# Patient Record
Sex: FEMALE | Race: WHITE | NOT HISPANIC OR LATINO | Employment: OTHER | URBAN - METROPOLITAN AREA
[De-identification: names, ages, dates, MRNs, and addresses within clinical notes are randomized per-mention and may not be internally consistent; named-entity substitution may affect disease eponyms.]

---

## 2018-05-20 ENCOUNTER — OFFICE VISIT (OUTPATIENT)
Dept: URGENT CARE | Facility: CLINIC | Age: 83
End: 2018-05-20
Payer: MEDICARE

## 2018-05-20 VITALS
HEIGHT: 61 IN | WEIGHT: 110 LBS | TEMPERATURE: 98 F | HEART RATE: 70 BPM | BODY MASS INDEX: 20.77 KG/M2 | OXYGEN SATURATION: 97 % | SYSTOLIC BLOOD PRESSURE: 158 MMHG | DIASTOLIC BLOOD PRESSURE: 61 MMHG

## 2018-05-20 DIAGNOSIS — R30.0 DYSURIA: Primary | ICD-10-CM

## 2018-05-20 DIAGNOSIS — N30.01 ACUTE CYSTITIS WITH HEMATURIA: ICD-10-CM

## 2018-05-20 LAB
BILIRUB UR QL STRIP: NEGATIVE
GLUCOSE UR QL STRIP: NEGATIVE
KETONES UR QL STRIP: NEGATIVE
LEUKOCYTE ESTERASE UR QL STRIP: POSITIVE
PH, POC UA: 5.5 (ref 5–8)
POC BLOOD, URINE: POSITIVE
POC NITRATES, URINE: POSITIVE
PROT UR QL STRIP: POSITIVE
SP GR UR STRIP: 1.02 (ref 1–1.03)
UROBILINOGEN UR STRIP-ACNC: NORMAL (ref 0.1–1.1)

## 2018-05-20 PROCEDURE — 81003 URINALYSIS AUTO W/O SCOPE: CPT | Mod: QW,S$GLB,, | Performed by: INTERNAL MEDICINE

## 2018-05-20 PROCEDURE — 99203 OFFICE O/P NEW LOW 30 MIN: CPT | Mod: 25,S$GLB,, | Performed by: INTERNAL MEDICINE

## 2018-05-20 RX ORDER — SULFAMETHOXAZOLE AND TRIMETHOPRIM 800; 160 MG/1; MG/1
1 TABLET ORAL 2 TIMES DAILY
Qty: 10 TABLET | Refills: 0 | Status: SHIPPED | OUTPATIENT
Start: 2018-05-20 | End: 2018-05-25

## 2018-05-20 RX ORDER — PANTOPRAZOLE SODIUM 40 MG/1
40 TABLET, DELAYED RELEASE ORAL DAILY
COMMUNITY

## 2018-05-20 RX ORDER — CHLORDIAZEPOXIDE HYDROCHLORIDE AND CLIDINIUM BROMIDE 5; 2.5 MG/1; MG/1
1 CAPSULE ORAL
COMMUNITY
End: 2021-12-01

## 2018-05-20 RX ORDER — ESTRADIOL 0.1 MG/G
CREAM VAGINAL
COMMUNITY
End: 2022-01-10 | Stop reason: CLARIF

## 2018-05-20 RX ORDER — PHENAZOPYRIDINE HYDROCHLORIDE 100 MG/1
100 TABLET, FILM COATED ORAL
Qty: 10 TABLET | Refills: 0 | Status: SHIPPED | OUTPATIENT
Start: 2018-05-20 | End: 2018-05-23

## 2018-05-20 RX ORDER — HYDROCHLOROTHIAZIDE 12.5 MG/1
12.5 CAPSULE ORAL DAILY
COMMUNITY
End: 2021-12-01

## 2018-05-20 RX ORDER — BISOPROLOL FUMARATE AND HYDROCHLOROTHIAZIDE 5; 6.25 MG/1; MG/1
1 TABLET ORAL DAILY
COMMUNITY
End: 2023-12-18

## 2018-05-20 NOTE — PROGRESS NOTES
"Subjective:       Patient ID: Lexii Dey is a 85 y.o. female.    Vitals:  height is 5' 1" (1.549 m) and weight is 49.9 kg (110 lb). Her oral temperature is 98.1 °F (36.7 °C). Her blood pressure is 158/61 (abnormal) and her pulse is 70. Her oxygen saturation is 97%.     Chief Complaint: Dysuria    Dysuria    This is a new problem. The current episode started yesterday. The problem has been gradually worsening. The quality of the pain is described as burning. The pain is mild. There has been no fever. She is sexually active. There is no history of pyelonephritis. Associated symptoms include frequency and urgency. Pertinent negatives include no chills, hematuria, nausea or vomiting. Associated symptoms comments: Low back pain. She has tried nothing for the symptoms. The treatment provided no relief.     Review of Systems   Constitution: Negative for chills and fever.   Skin: Negative for itching.   Musculoskeletal: Positive for back pain.   Gastrointestinal: Negative for abdominal pain, nausea and vomiting.   Genitourinary: Positive for dysuria, frequency and urgency. Negative for genital sores, hematuria, missed menses and non-menstrual bleeding.       Objective:      Physical Exam   Abdominal: Soft. Normal appearance and bowel sounds are normal. There is no hepatosplenomegaly. There is no rigidity, no rebound, no guarding, no CVA tenderness, no tenderness at McBurney's point and negative Hess's sign. No hernia.           Assessment:       1. Dysuria    2. Acute cystitis with hematuria        Plan:         Dysuria  -     POCT Urinalysis, Dipstick, Automated, W/O Scope  -     sulfamethoxazole-trimethoprim 800-160mg (BACTRIM DS) 800-160 mg Tab; Take 1 tablet by mouth 2 (two) times daily.  Dispense: 10 tablet; Refill: 0  -     phenazopyridine (PYRIDIUM) 100 MG tablet; Take 1 tablet (100 mg total) by mouth 3 (three) times daily with meals.  Dispense: 10 tablet; Refill: 0    Acute cystitis with hematuria  -     " sulfamethoxazole-trimethoprim 800-160mg (BACTRIM DS) 800-160 mg Tab; Take 1 tablet by mouth 2 (two) times daily.  Dispense: 10 tablet; Refill: 0  -     phenazopyridine (PYRIDIUM) 100 MG tablet; Take 1 tablet (100 mg total) by mouth 3 (three) times daily with meals.  Dispense: 10 tablet; Refill: 0      Take meds

## 2018-05-20 NOTE — PATIENT INSTRUCTIONS
"  Bladder Infection, Female (Adult)    Urine is normally doesn't have any bacteria in it. But bacteria can get into the urinary tract from the skin around the rectum. Or they can travel in the blood from elsewhere in the body. Once they are in your urinary tract, they can cause infection in the urethra (urethritis), the bladder (cystitis), or the kidneys (pyelonephritis).  The most common place for an infection is in the bladder. This is called a bladder infection. This is one of the most common infections in women. Most bladder infections are easily treated. They are not serious unless the infection spreads to the kidney.  The phrases "bladder infection," "UTI," and "cystitis" are often used to describe the same thing. But they are not always the same. Cystitis is an inflammation of the bladder. The most common cause of cystitis is an infection.  Symptoms  The infection causes inflammation in the urethra and bladder. This causes many of the symptoms. The most common symptoms of a bladder infection are:  · Pain or burning when urinating  · Having to urinate more often than usual  · Urgent need to urinate  · Only a small amount of urine comes out  · Blood in urine  · Abdominal discomfort. This is usually in the lower abdomen above the pubic bone.  · Cloudy urine  · Strong- or bad-smelling urine  · Unable to urinate (urinary retention)  · Unable to hold urine in (urinary incontinence)  · Fever  · Loss of appetite  · Confusion (in older adults)  Causes  Bladder infections are not contagious. You can't get one from someone else, from a toilet seat, or from sharing a bath.  The most common cause of bladder infections is bacteria from the bowels. The bacteria get onto the skin around the opening of the urethra. From there, they can get into the urine and travel up to the bladder, causing inflammation and infection. This usually happens because of:  · Wiping improperly after urinating. Always wipe from front to " back.  · Bowel incontinence  · Pregnancy  · Procedures such as having a catheter inserted  · Older age  · Not emptying your bladder. This can allow bacteria a chance to grow in your urine.  · Dehydration  · Constipation  · Sex  · Use of a diaphragm for birth control   Treatment  Bladder infections are diagnosed by a urine test. They are treated with antibiotics and usually clear up quickly without complications. Treatment helps prevent a more serious kidney infection.  Medicines  Medicines can help in the treatment of a bladder infection:  · Take antibiotics until they are used up, even if you feel better. It is important to finish them to make sure the infection has cleared.  · You can use acetaminophen or ibuprofen for pain, fever, or discomfort, unless another medicine was prescribed. If you have chronic liver or kidney disease, talk with your healthcare provider before using these medicines. Also talk with your provider if you've ever had a stomach ulcer or gastrointestinal bleeding, or are taking blood-thinner medicines.  · If you are given phenazopydridine to reduce burning with urination, it will cause your urine to become a bright orange color. This can stain clothing.  Care and prevention  These self-care steps can help prevent future infections:  · Drink plenty of fluids to prevent dehydration and flush out your bladder. Do this unless you must restrict fluids for other health reasons, or your doctor told you not to.  · Proper cleaning after going to the bathroom is important. Wipe from front to back after using the toilet to prevent the spread of bacteria.  · Urinate more often. Don't try to hold urine in for a long time.  · Wear loose-fitting clothes and cotton underwear. Avoid tight-fitting pants.  · Improve your diet and prevent constipation. Eat more fresh fruit and vegetables, and fiber, and less junk and fatty foods.  · Avoid sex until your symptoms are gone.  · Avoid caffeine, alcohol, and spicy  foods. These can irritate your bladder.  · Urinate right after intercourse to flush out your bladder.  · If you use birth control pills and have frequent bladder infections, discuss it with your doctor.  Follow-up care  Call your healthcare provider if all symptoms are not gone after 3 days of treatment. This is especially important if you have repeat infections.  If a culture was done, you will be told if your treatment needs to be changed. If directed, you can call to find out the results.  If X-rays were done, you will be told if the results will affect your treatment.  Call 911  Call 911 if any of the following occur:  · Trouble breathing  · Hard to wake up or confusion  · Fainting or loss of consciousness  · Rapid heart rate  When to seek medical advice  Call your healthcare provider right away if any of these occur:  · Fever of 100.4ºF (38.0ºC) or higher, or as directed by your healthcare provider  · Symptoms are not better by the third day of treatment  · Back or belly (abdominal) pain that gets worse  · Repeated vomiting, or unable to keep medicine down  · Weakness or dizziness  · Vaginal discharge  · Pain, redness, or swelling in the outer vaginal area (labia)  Date Last Reviewed: 10/1/2016  © 1319-4700 [employer]. [address] [place] [address], [place] [address]. All rights reserved. This information is not intended as a substitute for professional medical care. Always follow your healthcare professional's instructions.  Please return here or go to the Emergency Department for any concerns or worsening of condition.  If you were prescribed antibiotics, please take them to completion.  If you were prescribed a narcotic medication, do not drive or operate heavy equipment or machinery while taking these medications.  Please follow up with your primary care doctor or specialist as needed.    If you  smoke, please stop smoking.  *Urinary Tract Infections*  1) No Tub Bathes.  2) Urinate after  [place](Adults).  3)No Fizzy drinks/Soda drinks.  4)Wipe From front to Back.  5)Wear only Cotton Underwear.  6) Take the antibiotic you were given until it is all gone and drink a lot of fluids for 5 to 7 days to Flush Your Kidneys. Studies Show that cranberry Juice does not cure a UTI nor drinking it daily or taking Cranberry tablets will prevent a UTI.

## 2018-05-23 ENCOUNTER — TELEPHONE (OUTPATIENT)
Dept: URGENT CARE | Facility: CLINIC | Age: 83
End: 2018-05-23

## 2018-06-03 ENCOUNTER — OFFICE VISIT (OUTPATIENT)
Dept: URGENT CARE | Facility: CLINIC | Age: 83
End: 2018-06-03
Payer: MEDICARE

## 2018-06-03 VITALS
OXYGEN SATURATION: 97 % | DIASTOLIC BLOOD PRESSURE: 65 MMHG | TEMPERATURE: 98 F | SYSTOLIC BLOOD PRESSURE: 143 MMHG | HEART RATE: 72 BPM | HEIGHT: 60 IN | WEIGHT: 110 LBS | BODY MASS INDEX: 21.6 KG/M2 | RESPIRATION RATE: 16 BRPM

## 2018-06-03 DIAGNOSIS — R30.0 DYSURIA: ICD-10-CM

## 2018-06-03 DIAGNOSIS — N30.01 ACUTE CYSTITIS WITH HEMATURIA: Primary | ICD-10-CM

## 2018-06-03 LAB
BILIRUB UR QL STRIP: NEGATIVE
GLUCOSE UR QL STRIP: NEGATIVE
KETONES UR QL STRIP: NEGATIVE
LEUKOCYTE ESTERASE UR QL STRIP: POSITIVE
PH, POC UA: 6 (ref 5–8)
POC BLOOD, URINE: POSITIVE
POC NITRATES, URINE: NEGATIVE
PROT UR QL STRIP: POSITIVE
SP GR UR STRIP: 1.01 (ref 1–1.03)
UROBILINOGEN UR STRIP-ACNC: NORMAL (ref 0.1–1.1)

## 2018-06-03 PROCEDURE — 81003 URINALYSIS AUTO W/O SCOPE: CPT | Mod: QW,S$GLB,, | Performed by: NURSE PRACTITIONER

## 2018-06-03 PROCEDURE — 99213 OFFICE O/P EST LOW 20 MIN: CPT | Mod: 25,S$GLB,, | Performed by: NURSE PRACTITIONER

## 2018-06-03 RX ORDER — CIPROFLOXACIN 500 MG/1
500 TABLET ORAL 2 TIMES DAILY
Qty: 14 TABLET | Refills: 0 | Status: SHIPPED | OUTPATIENT
Start: 2018-06-03 | End: 2018-06-10

## 2018-06-03 NOTE — PATIENT INSTRUCTIONS
"*Urinary Tract Infections*  1) Avoid tub baths.  2) Always urinate after intercourse(Teens/Adults).  3) Avoid "Fizzy" drinks/soda drinks.  4) Always wipe from front to back.  5) Wear only cotton underwear.  6) Drink a lot of fluids (at least 8-10 glasses of water) for 5 to 7 days to help flush your kidneys. You can also drink 1 shot-sized glass of cranberry juice 3X daily over the next several days to help cleanse your bladder, but studies show that cranberry juice does not cure or prevent a UTI.   7) Take all medications as directed. Make sure to complete all antibiotics as prescribed.    8) For patients above 6 months of age who are not allergic to and are not on anticoagulants, you can alternate Tylenol and Motrin every 4-6 hours for fever above 100.4F and/or pain.  For patients less than 6 months of age, allergic to or intolerant to NSAIDS, have gastritis, gastric ulcers, or history of GI bleeds, are pregnant, or are on anticoagulant therapy, you can take Tylenol every 4 hours as needed for fever above 100.4F and/or pain.   9) You should schedule a follow-up appointment with your Primary Care Provider/Pediatrician for recheck in 2-3 days or as directed at this visit.   10) If your condition fails to improve in a timely manner, you should receive another evaluation by your Primary Care Provider/Pediatrician to discuss your concerns or return to urgent care for a recheck.  If your condition worsens at any time, you should report immediately to your nearest Emergency Department for further evaluation. **You must understand that you have received Urgent Care treatment only and that you may be released before all of your medical problems are known or treated. You, the patient, are responsible to arrange for follow-up care as instructed.       Please follow up with Dr. Leroy in 1-2 days  "

## 2018-06-03 NOTE — PROGRESS NOTES
Subjective:       Patient ID: Lexii Dey is a 85 y.o. female.    Vitals:  height is 5' (1.524 m) and weight is 49.9 kg (110 lb). Her oral temperature is 97.8 °F (36.6 °C). Her blood pressure is 143/65 (abnormal) and her pulse is 72. Her respiration is 16 and oxygen saturation is 97%.     Chief Complaint: Dysuria    Patient had a UTI two weeks ago and place on bactrim.  Better but came back today.      Dysuria    This is a recurrent problem. The current episode started acute onset. The problem occurs every urination. The problem has been unchanged. The quality of the pain is described as burning. The pain is at a severity of 10/10. The patient is experiencing no pain. Associated symptoms include frequency. Pertinent negatives include no chills, hematuria, nausea, urgency or vomiting. Associated symptoms comments: Back pain. She has tried acetaminophen for the symptoms. The treatment provided mild relief. Her past medical history is significant for recurrent UTIs and a urological procedure.     Review of Systems   Constitution: Negative for chills and fever.   Skin: Negative for itching.   Musculoskeletal: Negative for back pain.   Gastrointestinal: Negative for abdominal pain, nausea and vomiting.   Genitourinary: Positive for dysuria and frequency. Negative for genital sores, hematuria, missed menses, non-menstrual bleeding and urgency.       Objective:      Physical Exam   Constitutional: She is oriented to person, place, and time. She appears well-developed and well-nourished.   HENT:   Head: Normocephalic and atraumatic.   Right Ear: External ear normal.   Left Ear: External ear normal.   Nose: Nose normal. No nasal deformity. No epistaxis.   Mouth/Throat: Oropharynx is clear and moist and mucous membranes are normal.   Eyes: Conjunctivae and lids are normal.   Neck: Trachea normal, normal range of motion and phonation normal. Neck supple.   Cardiovascular: Normal rate, regular rhythm, normal heart sounds and  normal pulses.    Pulmonary/Chest: Effort normal and breath sounds normal.   Abdominal: Soft. Normal appearance and bowel sounds are normal. She exhibits no distension and no mass. There is no tenderness. There is no CVA tenderness.   Musculoskeletal:   +Left CVA tenderness   Neurological: She is alert and oriented to person, place, and time.   Skin: Skin is warm, dry and intact.   Psychiatric: She has a normal mood and affect. Her speech is normal and behavior is normal. Cognition and memory are normal.   Nursing note and vitals reviewed.      Assessment:       1. Acute cystitis with hematuria    2. Dysuria        Plan:       Results for orders placed or performed in visit on 06/03/18   POCT Urinalysis, Dipstick, Automated, W/O Scope   Result Value Ref Range    POC Blood, Urine Positive (A) Negative    POC Bilirubin, Urine Negative Negative    POC Urobilinogen, Urine Normal 0.1 - 1.1    POC Ketones, Urine Negative Negative    POC Protein, Urine Positive (A) Negative    POC Nitrates, Urine Negative Negative    POC Glucose, Urine Negative Negative    pH, UA 6.0 5 - 8    POC Specific Gravity, Urine 1.015 1.003 - 1.029    POC Leukocytes, Urine Positive (A) Negative     Acute cystitis with hematuria  -     POCT Urinalysis, Dipstick, Automated, W/O Scope  -     Urine culture  -     ciprofloxacin HCl (CIPRO) 500 MG tablet; Take 1 tablet (500 mg total) by mouth 2 (two) times daily.  Dispense: 14 tablet; Refill: 0    Dysuria  -     POCT Urinalysis, Dipstick, Automated, W/O Scope  -     Urine culture

## 2018-06-06 ENCOUNTER — TELEPHONE (OUTPATIENT)
Dept: URGENT CARE | Facility: CLINIC | Age: 83
End: 2018-06-06

## 2018-06-08 LAB
BACTERIA UR CULT: ABNORMAL
BACTERIA UR CULT: ABNORMAL
OTHER ANTIBIOTIC SUSC ISLT: ABNORMAL

## 2018-09-02 ENCOUNTER — OFFICE VISIT (OUTPATIENT)
Dept: URGENT CARE | Facility: CLINIC | Age: 83
End: 2018-09-02
Payer: MEDICARE

## 2018-09-02 VITALS — HEIGHT: 57 IN | BODY MASS INDEX: 23.73 KG/M2 | WEIGHT: 110 LBS | RESPIRATION RATE: 16 BRPM

## 2018-09-02 DIAGNOSIS — R30.0 DYSURIA: Primary | ICD-10-CM

## 2018-09-02 DIAGNOSIS — N30.00 ACUTE CYSTITIS WITHOUT HEMATURIA: ICD-10-CM

## 2018-09-02 LAB
BILIRUB UR QL STRIP: NEGATIVE
GLUCOSE UR QL STRIP: NEGATIVE
KETONES UR QL STRIP: NEGATIVE
LEUKOCYTE ESTERASE UR QL STRIP: POSITIVE
PH, POC UA: 5.5 (ref 5–8)
POC BLOOD, URINE: POSITIVE
POC NITRATES, URINE: NEGATIVE
PROT UR QL STRIP: POSITIVE
SP GR UR STRIP: 1.02 (ref 1–1.03)
UROBILINOGEN UR STRIP-ACNC: ABNORMAL (ref 0.1–1.1)

## 2018-09-02 PROCEDURE — 99213 OFFICE O/P EST LOW 20 MIN: CPT | Mod: 25,S$GLB,, | Performed by: INTERNAL MEDICINE

## 2018-09-02 PROCEDURE — 81003 URINALYSIS AUTO W/O SCOPE: CPT | Mod: QW,S$GLB,, | Performed by: INTERNAL MEDICINE

## 2018-09-02 RX ORDER — SULFAMETHOXAZOLE AND TRIMETHOPRIM 800; 160 MG/1; MG/1
1 TABLET ORAL 2 TIMES DAILY
Qty: 10 TABLET | Refills: 0 | Status: SHIPPED | OUTPATIENT
Start: 2018-09-02 | End: 2018-09-07

## 2018-09-02 RX ORDER — PHENAZOPYRIDINE HYDROCHLORIDE 100 MG/1
100 TABLET, FILM COATED ORAL
Qty: 10 TABLET | Refills: 0 | Status: SHIPPED | OUTPATIENT
Start: 2018-09-02 | End: 2018-09-05

## 2018-09-02 NOTE — PATIENT INSTRUCTIONS
"  Bladder Infection, Female (Adult)    Urine is normally doesn't have any bacteria in it. But bacteria can get into the urinary tract from the skin around the rectum. Or they can travel in the blood from elsewhere in the body. Once they are in your urinary tract, they can cause infection in the urethra (urethritis), the bladder (cystitis), or the kidneys (pyelonephritis).  The most common place for an infection is in the bladder. This is called a bladder infection. This is one of the most common infections in women. Most bladder infections are easily treated. They are not serious unless the infection spreads to the kidney.  The phrases "bladder infection," "UTI," and "cystitis" are often used to describe the same thing. But they are not always the same. Cystitis is an inflammation of the bladder. The most common cause of cystitis is an infection.  Symptoms  The infection causes inflammation in the urethra and bladder. This causes many of the symptoms. The most common symptoms of a bladder infection are:  · Pain or burning when urinating  · Having to urinate more often than usual  · Urgent need to urinate  · Only a small amount of urine comes out  · Blood in urine  · Abdominal discomfort. This is usually in the lower abdomen above the pubic bone.  · Cloudy urine  · Strong- or bad-smelling urine  · Unable to urinate (urinary retention)  · Unable to hold urine in (urinary incontinence)  · Fever  · Loss of appetite  · Confusion (in older adults)  Causes  Bladder infections are not contagious. You can't get one from someone else, from a toilet seat, or from sharing a bath.  The most common cause of bladder infections is bacteria from the bowels. The bacteria get onto the skin around the opening of the urethra. From there, they can get into the urine and travel up to the bladder, causing inflammation and infection. This usually happens because of:  · Wiping improperly after urinating. Always wipe from front to " back.  · Bowel incontinence  · Pregnancy  · Procedures such as having a catheter inserted  · Older age  · Not emptying your bladder. This can allow bacteria a chance to grow in your urine.  · Dehydration  · Constipation  · Sex  · Use of a diaphragm for birth control   Treatment  Bladder infections are diagnosed by a urine test. They are treated with antibiotics and usually clear up quickly without complications. Treatment helps prevent a more serious kidney infection.  Medicines  Medicines can help in the treatment of a bladder infection:  · Take antibiotics until they are used up, even if you feel better. It is important to finish them to make sure the infection has cleared.  · You can use acetaminophen or ibuprofen for pain, fever, or discomfort, unless another medicine was prescribed. If you have chronic liver or kidney disease, talk with your healthcare provider before using these medicines. Also talk with your provider if you've ever had a stomach ulcer or gastrointestinal bleeding, or are taking blood-thinner medicines.  · If you are given phenazopydridine to reduce burning with urination, it will cause your urine to become a bright orange color. This can stain clothing.  Care and prevention  These self-care steps can help prevent future infections:  · Drink plenty of fluids to prevent dehydration and flush out your bladder. Do this unless you must restrict fluids for other health reasons, or your doctor told you not to.  · Proper cleaning after going to the bathroom is important. Wipe from front to back after using the toilet to prevent the spread of bacteria.  · Urinate more often. Don't try to hold urine in for a long time.  · Wear loose-fitting clothes and cotton underwear. Avoid tight-fitting pants.  · Improve your diet and prevent constipation. Eat more fresh fruit and vegetables, and fiber, and less junk and fatty foods.  · Avoid sex until your symptoms are gone.  · Avoid caffeine, alcohol, and spicy  foods. These can irritate your bladder.  · Urinate right after intercourse to flush out your bladder.  · If you use birth control pills and have frequent bladder infections, discuss it with your doctor.  Follow-up care  Call your healthcare provider if all symptoms are not gone after 3 days of treatment. This is especially important if you have repeat infections.  If a culture was done, you will be told if your treatment needs to be changed. If directed, you can call to find out the results.  If X-rays were done, you will be told if the results will affect your treatment.  Call 911  Call 911 if any of the following occur:  · Trouble breathing  · Hard to wake up or confusion  · Fainting or loss of consciousness  · Rapid heart rate  When to seek medical advice  Call your healthcare provider right away if any of these occur:  · Fever of 100.4ºF (38.0ºC) or higher, or as directed by your healthcare provider  · Symptoms are not better by the third day of treatment  · Back or belly (abdominal) pain that gets worse  · Repeated vomiting, or unable to keep medicine down  · Weakness or dizziness  · Vaginal discharge  · Pain, redness, or swelling in the outer vaginal area (labia)  Date Last Reviewed: 10/1/2016  © 1233-4461 [employer]. [address] [place] [address], [place] [address]. All rights reserved. This information is not intended as a substitute for professional medical care. Always follow your healthcare professional's instructions.  Please return here or go to the Emergency Department for any concerns or worsening of condition.  If you were prescribed antibiotics, please take them to completion.  If you were prescribed a narcotic medication, do not drive or operate heavy equipment or machinery while taking these medications.  Please follow up with your primary care doctor or specialist as needed.    If you  smoke, please stop smoking.  *Urinary Tract Infections*  1) No Tub Bathes.  2) Urinate after  [place](Adults).  3)No Fizzy drinks/Soda drinks.  4)Wipe From front to Back.  5)Wear only Cotton Underwear.  6) Take the antibiotic you were given until it is all gone and drink a lot of fluids for 5 to 7 days to Flush Your Kidneys. Studies Show that cranberry Juice does not cure a UTI nor drinking it daily or taking Cranberry tablets will prevent a UTI.

## 2018-09-02 NOTE — PROGRESS NOTES
"Subjective:       Patient ID: Lexii Dey is a 86 y.o. female.    Vitals:  height is 4' 9" (1.448 m) and weight is 49.9 kg (110 lb). Her respiration is 16.     Chief Complaint: Dysuria    Dysuria    This is a new problem. The current episode started gradual onset. The problem occurs intermittently. The problem has been unchanged. The quality of the pain is described as burning. The pain is at a severity of 8/10. The pain is moderate. There has been no fever. Associated symptoms include flank pain. Pertinent negatives include no chills, hematuria, nausea, urgency or vomiting. She has tried nothing for the symptoms. The treatment provided no relief.     Review of Systems   Constitution: Negative for chills and fever.   Skin: Negative for itching.   Musculoskeletal: Negative for back pain.   Gastrointestinal: Negative for abdominal pain, nausea and vomiting.   Genitourinary: Positive for dysuria and flank pain. Negative for genital sores, hematuria, missed menses, non-menstrual bleeding and urgency.       Objective:      Physical Exam   Constitutional: She is oriented to person, place, and time. She appears well-developed and well-nourished. She is cooperative.  Non-toxic appearance. She does not appear ill. No distress.   HENT:   Head: Normocephalic and atraumatic.   Right Ear: Hearing, tympanic membrane, external ear and ear canal normal.   Left Ear: Hearing, tympanic membrane, external ear and ear canal normal.   Nose: Nose normal. No mucosal edema, rhinorrhea or nasal deformity. No epistaxis. Right sinus exhibits no maxillary sinus tenderness and no frontal sinus tenderness. Left sinus exhibits no maxillary sinus tenderness and no frontal sinus tenderness.   Mouth/Throat: Uvula is midline, oropharynx is clear and moist and mucous membranes are normal. No trismus in the jaw. Normal dentition. No uvula swelling. No posterior oropharyngeal erythema.   Eyes: Conjunctivae and lids are normal. Right eye exhibits no " discharge. Left eye exhibits no discharge. No scleral icterus.   Sclera clear bilat   Neck: Trachea normal, normal range of motion, full passive range of motion without pain and phonation normal. Neck supple.   Cardiovascular: Normal rate, regular rhythm, normal heart sounds, intact distal pulses and normal pulses.   Pulmonary/Chest: Effort normal and breath sounds normal. No respiratory distress.   Abdominal: Soft. Normal appearance and bowel sounds are normal. She exhibits no distension, no pulsatile midline mass and no mass. There is no hepatosplenomegaly. There is no tenderness. There is no rigidity, no rebound, no guarding, no CVA tenderness, no tenderness at McBurney's point and negative Hess's sign. No hernia.       Musculoskeletal: Normal range of motion. She exhibits no edema or deformity.   Neurological: She is alert and oriented to person, place, and time. She exhibits normal muscle tone. Coordination normal.   Skin: Skin is warm, dry and intact. She is not diaphoretic. No pallor.   Psychiatric: She has a normal mood and affect. Her speech is normal and behavior is normal. Judgment and thought content normal. Cognition and memory are normal.   Nursing note and vitals reviewed.      Assessment:       1. Dysuria    2. Acute cystitis without hematuria        Plan:         Dysuria  -     POCT Urinalysis, Dipstick, Automated, W/O Scope  -     sulfamethoxazole-trimethoprim 800-160mg (BACTRIM DS) 800-160 mg Tab; Take 1 tablet by mouth 2 (two) times daily. for 5 days  Dispense: 10 tablet; Refill: 0  -     phenazopyridine (PYRIDIUM) 100 MG tablet; Take 1 tablet (100 mg total) by mouth 3 (three) times daily with meals. for 3 days  Dispense: 10 tablet; Refill: 0    Acute cystitis without hematuria  -     sulfamethoxazole-trimethoprim 800-160mg (BACTRIM DS) 800-160 mg Tab; Take 1 tablet by mouth 2 (two) times daily. for 5 days  Dispense: 10 tablet; Refill: 0  -     phenazopyridine (PYRIDIUM) 100 MG tablet; Take 1  tablet (100 mg total) by mouth 3 (three) times daily with meals. for 3 days  Dispense: 10 tablet; Refill: 0    take meds

## 2018-09-09 ENCOUNTER — TELEPHONE (OUTPATIENT)
Dept: URGENT CARE | Facility: CLINIC | Age: 83
End: 2018-09-09

## 2022-10-24 ENCOUNTER — OFFICE VISIT (OUTPATIENT)
Dept: WOUND CARE | Facility: HOSPITAL | Age: 87
End: 2022-10-24
Attending: SURGERY
Payer: MEDICARE

## 2022-10-24 VITALS
HEART RATE: 80 BPM | SYSTOLIC BLOOD PRESSURE: 130 MMHG | TEMPERATURE: 98 F | DIASTOLIC BLOOD PRESSURE: 86 MMHG | RESPIRATION RATE: 18 BRPM

## 2022-10-24 DIAGNOSIS — L97.812 NON-PRESSURE CHRONIC ULCER OF OTHER PART OF RIGHT LOWER LEG WITH FAT LAYER EXPOSED: ICD-10-CM

## 2022-10-24 DIAGNOSIS — R60.0 EDEMA OF RIGHT LOWER LEG: ICD-10-CM

## 2022-10-24 PROBLEM — L97.822 NON-PRESSURE CHRONIC ULCER OF OTHER PART OF LEFT LOWER LEG WITH FAT LAYER EXPOSED: Status: ACTIVE | Noted: 2022-10-24

## 2022-10-24 PROCEDURE — 25000003 PHARM REV CODE 250

## 2022-10-24 PROCEDURE — 11042 DBRDMT SUBQ TIS 1ST 20SQCM/<: CPT

## 2022-10-24 PROCEDURE — 99214 OFFICE O/P EST MOD 30 MIN: CPT | Mod: 25

## 2022-10-24 PROCEDURE — 99499 NO LOS: ICD-10-PCS | Mod: ,,, | Performed by: SURGERY

## 2022-10-24 PROCEDURE — 99499 UNLISTED E&M SERVICE: CPT | Mod: ,,, | Performed by: SURGERY

## 2022-10-24 NOTE — ASSESSMENT & PLAN NOTE
Patient with mild edema of the right lower leg.    Tubigrip was applied.    Elevation was recommended on a routine basis when not walking.  Patient does have allowing chair and is able to elevate her legs.

## 2022-10-24 NOTE — ASSESSMENT & PLAN NOTE
The shape of the wound suggest his traumatic wound.    Excisional debridement performed of necrotic skin and subcutaneous tissue.    Begin Santyl daily.  Tubigrip and leg elevation.

## 2022-10-24 NOTE — H&P
[facility]  Wound Care  History and Physical    Problem List Items Addressed This Visit          Orthopedic    Non-pressure chronic ulcer of other part of right lower leg with fat layer exposed    Overview     89-year-old female who presented on 10/24/2022 with a wound to her anterior right lower leg.  She indicates the wound had been present for approximately a year.  Visualization of the wound suggest otherwise.  The patient does have a history of memory loss and is likely not able to have a clear history regarding timing.  Patient is not sure exactly how the wound occurred.  Patient complains of pain in the area of the wound on the right lower leg whenever she is walking.  There has been minimal drainage.  There has been no fever or chills.         Current Assessment & Plan     The shape of the wound suggest his traumatic wound.    Excisional debridement performed of necrotic skin and subcutaneous tissue.    Begin Santyl daily.  Tubigrip and leg elevation.            Other    Edema of right lower leg    Current Assessment & Plan     Patient with mild edema of the right lower leg.    Tubigrip was applied.    Elevation was recommended on a routine basis when not walking.  Patient does have allowing chair and is able to elevate her legs.              History:    Past Medical History:   Diagnosis Date    Acid reflux     GERD    Anticoagulant long-term use     Anxiety     Aortic stenosis     Arthritis     CHF (congestive heart failure)     Coronary artery disease     COVID-19 vaccine series completed     Encounter for blood transfusion     Glaucoma     HHD (hypertensive heart disease)     Hypertension     Scoliosis     Stroke     tia    Thyroid disease     HISTORY OF THYROID PROBLEMS BUT RESOLVED    TIA (transient ischemic attack) 2020    UTI (urinary tract infection)     FREQUENT    UTI (urinary tract infection)        Past Surgical History:   Procedure Laterality Date    APPENDECTOMY      BLADDER  SURGERY      CATARACT EXTRACTION, BILATERAL      FOOT SURGERY      HERNIA REPAIR      HYSTERECTOMY      JOINT REPLACEMENT      RIGHT KNEE    KNEE ARTHROSCOPY      LEFT HEART CATHETERIZATION Left 12/7/2021    Procedure: Left heart cath;  Surgeon: Thompson Beaulieu MD;  Location: [facility] CATH;  Service: Cardiology;  Laterality: Left;    PERCUTANEOUS TRANSCATHETER AORTIC VALVE REPLACEMENT (TAVR) Right 1/11/2022    Procedure: REPLACEMENT, AORTIC VALVE, PERCUTANEOUS, TRANSCATHETER;  Surgeon: Thompson Beaulieu MD;  Location: [facility] CATH;  Service: Cardiology;  Laterality: Right;  ops # 2    PERCUTANEOUS TRANSCATHETER AORTIC VALVE REPLACEMENT (TAVR) Right 1/11/2022    Procedure: REPLACEMENT, AORTIC VALVE, PERCUTANEOUS, TRANSCATHETER;  Surgeon: Maribel Lawson MD;  Location: [facility] CATH;  Service: Cardiovascular;  Laterality: Right;       Family History   Problem Relation Age of Onset    Hypertension Mother     Stroke Father     Hypertension Father     Heart disease Sister         reports that she has never smoked. She has never used smokeless tobacco. She reports that she does not drink alcohol and does not use drugs.    has a current medication list which includes the following prescription(s): acetaminophen, aspirin, atorvastatin, bisoprolol, bisoprolol-hydrochlorothiazide 5-6.25 mg, citalopram, clopidogrel, dicyclomine, estrogens (conjugated), ferrous sulfate, latanoprost, melatonin, melatonin, ondansetron, pantoprazole, and valsartan.    Allergies:  Penicillins    Review of Systems:  Review of Systems   Constitutional:  Negative for chills, fever, malaise/fatigue and weight loss.   HENT:  Negative for nosebleeds and sore throat.    Eyes:  Negative for photophobia and pain.   Respiratory:  Negative for cough and hemoptysis.    Cardiovascular:  Positive for leg swelling (right leg). Negative for chest pain and orthopnea.   Gastrointestinal:  Negative for abdominal pain, nausea and vomiting.   Genitourinary:  Negative for  hematuria.   Musculoskeletal:  Negative for back pain and neck pain.   Skin:  Negative for itching and rash.   Neurological:  Negative for focal weakness and seizures.   Endo/Heme/Allergies:  Does not bruise/bleed easily.   Psychiatric/Behavioral:  Positive for memory loss.        Vitals:    10/24/22 1047   BP: 130/86   Pulse: 80   Resp: 18   Temp: 97.6 °F (36.4 °C)         BMI:  There is no height or weight on file to calculate BMI.    Physical Exam:  Physical Exam  Vitals reviewed.   Constitutional:       General: She is not in acute distress.     Appearance: Normal appearance.   HENT:      Head: Normocephalic and atraumatic.      Mouth/Throat:      Mouth: Mucous membranes are moist.   Eyes:      General: No scleral icterus.     Extraocular Movements: Extraocular movements intact.      Pupils: Pupils are equal, round, and reactive to light.   Cardiovascular:      Rate and Rhythm: Normal rate and regular rhythm.      Comments: Patient has bounding bilateral dorsalis pedis pulses.    Patient has nonpalpable posterior tibial pulses.  Pulmonary:      Effort: Pulmonary effort is normal. No respiratory distress.      Breath sounds: Normal breath sounds.   Abdominal:      General: Bowel sounds are normal. There is no distension.      Palpations: Abdomen is soft.      Comments: Low midline scar   Musculoskeletal:         General: Swelling (Mild edema of the right lower extremity) present.      Cervical back: Normal range of motion and neck supple. No tenderness.      Comments: See wound progress note for detailed wound description.     Skin:     General: Skin is warm and dry.      Capillary Refill: Capillary refill takes less than 2 seconds.   Neurological:      General: No focal deficit present.      Mental Status: She is alert and oriented to person, place, and time.   Psychiatric:         Thought Content: Thought content normal.       A1C:  No results for input(s): HGBA1C in the last 2160 hours.  BMP:  No results for  input(s): GLU, NA, K, CL, CO2, BUN, CREATININE, CALCIUM, MG in the last 2160 hours.   CBC:  No results for input(s): WBC, RBC, HGB, HCT, PLT, MCV, MCH, MCHC in the last 2160 hours.  CMP:  No results for input(s): GLU, CALCIUM, ALBUMIN, PROT, NA, K, CO2, CL, BUN, ALKPHOS, ALT, AST, BILITOT in the last 2160 hours.    Invalid input(s): CREATININ  PREALBUMIN:  No results for input(s): PREALBUMIN in the last 2160 hours.  WOUND CULTURES:  No results for input(s): LABAERO in the last 2160 hours.        Plan:  See Wound Docs note for plan and follow up.        Yaw Arellano  [facility]

## 2022-10-24 NOTE — PROGRESS NOTES
[facility]  Wound Care  Progress Note    Problem List Items Addressed This Visit          Orthopedic    Non-pressure chronic ulcer of other part of right lower leg with fat layer exposed    Overview     89-year-old female who presented on 10/24/2022 with a wound to her anterior right lower leg.  She indicates the wound had been present for approximately a year.  Visualization of the wound suggest otherwise.  The patient does have a history of memory loss and is likely not able to have a clear history regarding timing.  Patient is not sure exactly how the wound occurred.  Patient complains of pain in the area of the wound on the right lower leg whenever she is walking.  There has been minimal drainage.  There has been no fever or chills.         Current Assessment & Plan     The shape of the wound suggest his traumatic wound.    Excisional debridement performed of necrotic skin and subcutaneous tissue.    Begin Santyl daily.  Tubigrip and leg elevation.            Other    Edema of right lower leg    Current Assessment & Plan     Patient with mild edema of the right lower leg.    Tubigrip was applied.    Elevation was recommended on a routine basis when not walking.  Patient does have allowing chair and is able to elevate her legs.            See wound doc progress notes. Documents will be scanned.        Yaw Arellano  [facility]

## 2022-10-31 ENCOUNTER — OFFICE VISIT (OUTPATIENT)
Dept: WOUND CARE | Facility: HOSPITAL | Age: 87
End: 2022-10-31
Attending: SURGERY
Payer: MEDICARE

## 2022-10-31 VITALS
TEMPERATURE: 98 F | HEART RATE: 78 BPM | RESPIRATION RATE: 18 BRPM | SYSTOLIC BLOOD PRESSURE: 128 MMHG | DIASTOLIC BLOOD PRESSURE: 84 MMHG

## 2022-10-31 DIAGNOSIS — L97.812 NON-PRESSURE CHRONIC ULCER OF OTHER PART OF RIGHT LOWER LEG WITH FAT LAYER EXPOSED: ICD-10-CM

## 2022-10-31 PROCEDURE — 99499 NO LOS: ICD-10-PCS | Mod: ,,, | Performed by: SURGERY

## 2022-10-31 PROCEDURE — 99499 UNLISTED E&M SERVICE: CPT | Mod: ,,, | Performed by: SURGERY

## 2022-10-31 PROCEDURE — 11042 DBRDMT SUBQ TIS 1ST 20SQCM/<: CPT

## 2022-10-31 NOTE — PROGRESS NOTES
[facility]  Wound Care  Progress Note    Problem List Items Addressed This Visit       Non-pressure chronic ulcer of other part of right lower leg with fat layer exposed    Overview     89-year-old female who presented on 10/24/2022 with a wound to her anterior right lower leg.  She indicates the wound had been present for approximately a year.  Visualization of the wound suggest otherwise.  The patient does have a history of memory loss and is likely not able to have a clear history regarding timing.  Patient is not sure exactly how the wound occurred.  Patient complains of pain in the area of the wound on the right lower leg whenever she is walking.  There has been minimal drainage.  There has been no fever or chills.         Current Assessment & Plan     Wound without infection.  Some granulation tissue present.  Moderate nonviable tissue especially at the posterior aspect of the wound.  Continued sharp debridement is needed.  Continue Santyl.  Continue compression for edema control.            See wound doc progress notes. Documents will be scanned.        Jose Jain  [facility]

## 2022-10-31 NOTE — ASSESSMENT & PLAN NOTE
Wound without infection.  Some granulation tissue present.  Moderate nonviable tissue especially at the posterior aspect of the wound.  Continued sharp debridement is needed.  Continue Santyl.  Continue compression for edema control.

## 2022-11-07 ENCOUNTER — OFFICE VISIT (OUTPATIENT)
Dept: WOUND CARE | Facility: HOSPITAL | Age: 87
End: 2022-11-07
Attending: SURGERY
Payer: MEDICARE

## 2022-11-07 VITALS
TEMPERATURE: 98 F | DIASTOLIC BLOOD PRESSURE: 79 MMHG | SYSTOLIC BLOOD PRESSURE: 126 MMHG | RESPIRATION RATE: 17 BRPM | HEART RATE: 74 BPM

## 2022-11-07 DIAGNOSIS — L97.812 NON-PRESSURE CHRONIC ULCER OF OTHER PART OF RIGHT LOWER LEG WITH FAT LAYER EXPOSED: ICD-10-CM

## 2022-11-07 PROCEDURE — 11042 DBRDMT SUBQ TIS 1ST 20SQCM/<: CPT

## 2022-11-07 PROCEDURE — 99499 UNLISTED E&M SERVICE: CPT | Mod: ,,, | Performed by: SURGERY

## 2022-11-07 PROCEDURE — 99499 NO LOS: ICD-10-PCS | Mod: ,,, | Performed by: SURGERY

## 2022-11-07 NOTE — PROGRESS NOTES
[facility]  Wound Care  Progress Note    Problem List Items Addressed This Visit          Orthopedic    Non-pressure chronic ulcer of other part of right lower leg with fat layer exposed    Overview     89-year-old female who presented on 10/24/2022 with a wound to her anterior right lower leg.  She indicates the wound had been present for approximately a year.  Visualization of the wound suggest otherwise.  The patient does have a history of memory loss and is likely not able to have a clear history regarding timing.  Patient is not sure exactly how the wound occurred.  Patient complains of pain in the area of the wound on the right lower leg whenever she is walking.  There has been minimal drainage.  There has been no fever or chills.         Current Assessment & Plan     Wound is improved.  There is increased granulation tissue.    Continue debridement to remove nonviable slough and maintain active phase wound healing.    Continue Santyl.            See wound doc progress notes. Documents will be scanned.        Yaw Arellano  [facility]

## 2022-11-07 NOTE — ASSESSMENT & PLAN NOTE
Wound is improved.  There is increased granulation tissue.    Continue debridement to remove nonviable slough and maintain active phase wound healing.    Continue Santyl.

## 2022-11-14 ENCOUNTER — OFFICE VISIT (OUTPATIENT)
Dept: WOUND CARE | Facility: HOSPITAL | Age: 87
End: 2022-11-14
Attending: SURGERY
Payer: MEDICARE

## 2022-11-14 VITALS
RESPIRATION RATE: 18 BRPM | DIASTOLIC BLOOD PRESSURE: 68 MMHG | HEART RATE: 79 BPM | TEMPERATURE: 98 F | SYSTOLIC BLOOD PRESSURE: 109 MMHG

## 2022-11-14 DIAGNOSIS — L97.812 NON-PRESSURE CHRONIC ULCER OF OTHER PART OF RIGHT LOWER LEG WITH FAT LAYER EXPOSED: ICD-10-CM

## 2022-11-14 PROCEDURE — 99499 NO LOS: ICD-10-PCS | Mod: ,,, | Performed by: SURGERY

## 2022-11-14 PROCEDURE — 99499 UNLISTED E&M SERVICE: CPT | Mod: ,,, | Performed by: SURGERY

## 2022-11-14 PROCEDURE — 11042 DBRDMT SUBQ TIS 1ST 20SQCM/<: CPT

## 2022-11-14 NOTE — ASSESSMENT & PLAN NOTE
Wound improved.  Minimal slough present.  Wound mostly granulating.  Continued sharp debridement is needed.  Continue Santyl.

## 2022-11-21 ENCOUNTER — OFFICE VISIT (OUTPATIENT)
Dept: WOUND CARE | Facility: HOSPITAL | Age: 87
End: 2022-11-21
Attending: SURGERY
Payer: MEDICARE

## 2022-11-21 VITALS
SYSTOLIC BLOOD PRESSURE: 112 MMHG | TEMPERATURE: 98 F | RESPIRATION RATE: 18 BRPM | HEART RATE: 67 BPM | DIASTOLIC BLOOD PRESSURE: 63 MMHG

## 2022-11-21 DIAGNOSIS — R60.0 LOCALIZED EDEMA: Primary | ICD-10-CM

## 2022-11-21 DIAGNOSIS — L97.812 NON-PRESSURE CHRONIC ULCER OF OTHER PART OF RIGHT LOWER LEG WITH FAT LAYER EXPOSED: ICD-10-CM

## 2022-11-21 PROCEDURE — 99499 UNLISTED E&M SERVICE: CPT | Mod: ,,, | Performed by: SURGERY

## 2022-11-21 PROCEDURE — 11042 DBRDMT SUBQ TIS 1ST 20SQCM/<: CPT

## 2022-11-21 PROCEDURE — 99499 NO LOS: ICD-10-PCS | Mod: ,,, | Performed by: SURGERY

## 2022-11-21 NOTE — ASSESSMENT & PLAN NOTE
Wound continues to improve.  There is granulation tissue and epithelialization at the wound edge.  There is minimal slough present.  Daughter reports moderate drainage.  Will stop Santyl and change the silver alginate.

## 2022-11-21 NOTE — PROGRESS NOTES
[facility]  Wound Care  Progress Note    Problem List Items Addressed This Visit       Non-pressure chronic ulcer of other part of right lower leg with fat layer exposed    Overview     89-year-old female who presented on 10/24/2022 with a wound to her anterior right lower leg.  She indicates the wound had been present for approximately a year.  Visualization of the wound suggest otherwise.  The patient does have a history of memory loss and is likely not able to have a clear history regarding timing.  Patient is not sure exactly how the wound occurred.  Patient complains of pain in the area of the wound on the right lower leg whenever she is walking.  There has been minimal drainage.  There has been no fever or chills.         Current Assessment & Plan     Wound continues to improve.  There is granulation tissue and epithelialization at the wound edge.  There is minimal slough present.  Daughter reports moderate drainage.  Will stop Santyl and change the silver alginate.            See wound doc progress notes. Documents will be scanned.        Jose Jain  [facility]

## 2022-11-28 ENCOUNTER — OFFICE VISIT (OUTPATIENT)
Dept: WOUND CARE | Facility: HOSPITAL | Age: 87
End: 2022-11-28
Attending: SURGERY
Payer: MEDICARE

## 2022-11-28 VITALS
DIASTOLIC BLOOD PRESSURE: 65 MMHG | HEART RATE: 68 BPM | SYSTOLIC BLOOD PRESSURE: 110 MMHG | RESPIRATION RATE: 16 BRPM | TEMPERATURE: 98 F

## 2022-11-28 DIAGNOSIS — L97.812 NON-PRESSURE CHRONIC ULCER OF OTHER PART OF RIGHT LOWER LEG WITH FAT LAYER EXPOSED: Primary | ICD-10-CM

## 2022-11-28 DIAGNOSIS — R60.0 EDEMA OF RIGHT LOWER LEG: ICD-10-CM

## 2022-11-28 DIAGNOSIS — R60.0 LOCALIZED EDEMA: ICD-10-CM

## 2022-11-28 PROCEDURE — 99499 NO LOS: ICD-10-PCS | Mod: ,,, | Performed by: SURGERY

## 2022-11-28 PROCEDURE — 11042 DBRDMT SUBQ TIS 1ST 20SQCM/<: CPT

## 2022-11-28 PROCEDURE — 99499 UNLISTED E&M SERVICE: CPT | Mod: ,,, | Performed by: SURGERY

## 2022-11-28 NOTE — PROGRESS NOTES
[facility]  Wound Care  Progress Note    Problem List Items Addressed This Visit          Orthopedic    Non-pressure chronic ulcer of other part of right lower leg with fat layer exposed - Primary    Overview     89-year-old female who presented on 10/24/2022 with a wound to her anterior right lower leg.  She indicates the wound had been present for approximately a year.  Visualization of the wound suggest otherwise.  The patient does have a history of memory loss and is likely not able to have a clear history regarding timing.  Patient is not sure exactly how the wound occurred.  Patient complains of pain in the area of the wound on the right lower leg whenever she is walking.  There has been minimal drainage.  There has been no fever or chills.         Current Assessment & Plan     Wound is improving.  Continue debridement to remove nonviable tissue and maintain active phase of wound healing.  Continue compression.            Other    Edema of right lower leg     Other Visit Diagnoses       Localized edema                See wound doc progress notes. Documents will be scanned.        Yaw Arellano  [facility]

## 2022-11-28 NOTE — ASSESSMENT & PLAN NOTE
Wound is improving.  Continue debridement to remove nonviable tissue and maintain active phase of wound healing.  Continue compression.

## 2022-12-05 ENCOUNTER — OFFICE VISIT (OUTPATIENT)
Dept: WOUND CARE | Facility: HOSPITAL | Age: 87
End: 2022-12-05
Attending: SURGERY
Payer: MEDICARE

## 2022-12-05 VITALS
RESPIRATION RATE: 18 BRPM | TEMPERATURE: 98 F | DIASTOLIC BLOOD PRESSURE: 68 MMHG | HEART RATE: 68 BPM | SYSTOLIC BLOOD PRESSURE: 130 MMHG

## 2022-12-05 DIAGNOSIS — L97.812 NON-PRESSURE CHRONIC ULCER OF OTHER PART OF RIGHT LOWER LEG WITH FAT LAYER EXPOSED: ICD-10-CM

## 2022-12-05 PROCEDURE — 99499 UNLISTED E&M SERVICE: CPT | Mod: ,,, | Performed by: SURGERY

## 2022-12-05 PROCEDURE — 11042 DBRDMT SUBQ TIS 1ST 20SQCM/<: CPT

## 2022-12-05 PROCEDURE — 99499 NO LOS: ICD-10-PCS | Mod: ,,, | Performed by: SURGERY

## 2022-12-05 NOTE — PROGRESS NOTES
[facility]  Wound Care  Progress Note    Problem List Items Addressed This Visit       Non-pressure chronic ulcer of other part of right lower leg with fat layer exposed    Overview     89-year-old female who presented on 10/24/2022 with a wound to her anterior right lower leg.  She indicates the wound had been present for approximately a year.  Visualization of the wound suggest otherwise.  The patient does have a history of memory loss and is likely not able to have a clear history regarding timing.  Patient is not sure exactly how the wound occurred.  Patient complains of pain in the area of the wound on the right lower leg whenever she is walking.  There has been minimal drainage.  There has been no fever or chills.         Current Assessment & Plan     Wound continues to improved and is nearly resolved.  Wound bed clean and granulating.  Continue current management anticipate wound closure in the next 2 weeks            See wound doc progress notes. Documents will be scanned.        Jose Jain  [facility]

## 2022-12-05 NOTE — PROGRESS NOTES
[facility]  Wound Care  Progress Note    Problem List Items Addressed This Visit       Non-pressure chronic ulcer of other part of right lower leg with fat layer exposed    Overview     89-year-old female who presented on 10/24/2022 with a wound to her anterior right lower leg.  She indicates the wound had been present for approximately a year.  Visualization of the wound suggest otherwise.  The patient does have a history of memory loss and is likely not able to have a clear history regarding timing.  Patient is not sure exactly how the wound occurred.  Patient complains of pain in the area of the wound on the right lower leg whenever she is walking.  There has been minimal drainage.  There has been no fever or chills.         Current Assessment & Plan     Wound improved.  Minimal slough present.  Wound mostly granulating.  Continued sharp debridement is needed.  Continue Santyl.            See wound doc progress notes. Documents will be scanned.        Jose Jain  [facility]

## 2022-12-05 NOTE — ASSESSMENT & PLAN NOTE
Wound continues to improved and is nearly resolved.  Wound bed clean and granulating.  Continue current management anticipate wound closure in the next 2 weeks

## 2022-12-12 ENCOUNTER — OFFICE VISIT (OUTPATIENT)
Dept: WOUND CARE | Facility: HOSPITAL | Age: 87
End: 2022-12-12
Attending: SURGERY
Payer: MEDICARE

## 2022-12-12 VITALS
TEMPERATURE: 98 F | DIASTOLIC BLOOD PRESSURE: 67 MMHG | RESPIRATION RATE: 18 BRPM | SYSTOLIC BLOOD PRESSURE: 128 MMHG | HEART RATE: 66 BPM

## 2022-12-12 DIAGNOSIS — R60.0 EDEMA OF RIGHT LOWER LEG: ICD-10-CM

## 2022-12-12 DIAGNOSIS — L97.812 NON-PRESSURE CHRONIC ULCER OF OTHER PART OF RIGHT LOWER LEG WITH FAT LAYER EXPOSED: Primary | ICD-10-CM

## 2022-12-12 DIAGNOSIS — R60.0 LOCALIZED EDEMA: ICD-10-CM

## 2022-12-12 PROCEDURE — 11042 DBRDMT SUBQ TIS 1ST 20SQCM/<: CPT

## 2022-12-12 PROCEDURE — 99499 UNLISTED E&M SERVICE: CPT | Mod: ,,, | Performed by: SURGERY

## 2022-12-12 PROCEDURE — 99499 NO LOS: ICD-10-PCS | Mod: ,,, | Performed by: SURGERY

## 2022-12-12 NOTE — ASSESSMENT & PLAN NOTE
Wound is markedly improved.    Continue debridement to maintain active phase wound healing.    Continue silver alginate.    Continue compression.

## 2022-12-12 NOTE — PROGRESS NOTES
[facility]  Wound Care  Progress Note    Problem List Items Addressed This Visit          Orthopedic    Non-pressure chronic ulcer of other part of right lower leg with fat layer exposed - Primary    Overview     89-year-old female who presented on 10/24/2022 with a wound to her anterior right lower leg.  She indicates the wound had been present for approximately a year.  Visualization of the wound suggest otherwise.  The patient does have a history of memory loss and is likely not able to have a clear history regarding timing.  Patient is not sure exactly how the wound occurred.  Patient complains of pain in the area of the wound on the right lower leg whenever she is walking.  There has been minimal drainage.  There has been no fever or chills.         Current Assessment & Plan     Wound is markedly improved.    Continue debridement to maintain active phase wound healing.    Continue silver alginate.    Continue compression.            Other    Edema of right lower leg    Current Assessment & Plan     Controlled          Other Visit Diagnoses       Localized edema                See wound doc progress notes. Documents will be scanned.        Yaw Arellano  [facility]

## 2023-01-09 ENCOUNTER — OFFICE VISIT (OUTPATIENT)
Dept: WOUND CARE | Facility: HOSPITAL | Age: 88
End: 2023-01-09
Attending: SURGERY
Payer: MEDICARE

## 2023-01-09 VITALS
RESPIRATION RATE: 17 BRPM | DIASTOLIC BLOOD PRESSURE: 67 MMHG | HEART RATE: 60 BPM | TEMPERATURE: 98 F | SYSTOLIC BLOOD PRESSURE: 130 MMHG

## 2023-01-09 DIAGNOSIS — L97.812 NON-PRESSURE CHRONIC ULCER OF OTHER PART OF RIGHT LOWER LEG WITH FAT LAYER EXPOSED: ICD-10-CM

## 2023-01-09 PROCEDURE — 99499 NO LOS: ICD-10-PCS | Mod: ,,, | Performed by: SURGERY

## 2023-01-09 PROCEDURE — 99499 UNLISTED E&M SERVICE: CPT | Mod: ,,, | Performed by: SURGERY

## 2023-01-09 PROCEDURE — 1159F MED LIST DOCD IN RCRD: CPT | Mod: CPTII,,, | Performed by: SURGERY

## 2023-01-09 PROCEDURE — 99214 OFFICE O/P EST MOD 30 MIN: CPT

## 2023-01-09 PROCEDURE — 1159F PR MEDICATION LIST DOCUMENTED IN MEDICAL RECORD: ICD-10-PCS | Mod: CPTII,,, | Performed by: SURGERY

## 2023-01-09 NOTE — PROGRESS NOTES
[facility]  Wound Care  Progress Note    Problem List Items Addressed This Visit       Non-pressure chronic ulcer of other part of right lower leg with fat layer exposed    Overview     89-year-old female who presented on 10/24/2022 with a wound to her anterior right lower leg.  She indicates the wound had been present for approximately a year.  Visualization of the wound suggest otherwise.  The patient does have a history of memory loss and is likely not able to have a clear history regarding timing.  Patient is not sure exactly how the wound occurred.  Patient complains of pain in the area of the wound on the right lower leg whenever she is walking.  There has been minimal drainage.  There has been no fever or chills.         Current Assessment & Plan     Wound has resolved.  Patient will be discharged from the Wound Center            See wound doc progress notes. Documents will be scanned.        Jose Jain  [facility]

## 2023-12-18 ENCOUNTER — LAB VISIT (OUTPATIENT)
Dept: LAB | Facility: HOSPITAL | Age: 88
End: 2023-12-18
Attending: PSYCHIATRY & NEUROLOGY
Payer: MEDICARE

## 2023-12-18 ENCOUNTER — OFFICE VISIT (OUTPATIENT)
Dept: NEUROLOGY | Facility: CLINIC | Age: 88
End: 2023-12-18
Payer: MEDICARE

## 2023-12-18 VITALS
DIASTOLIC BLOOD PRESSURE: 82 MMHG | HEIGHT: 60 IN | WEIGHT: 132.94 LBS | HEART RATE: 84 BPM | BODY MASS INDEX: 26.1 KG/M2 | SYSTOLIC BLOOD PRESSURE: 130 MMHG | RESPIRATION RATE: 14 BRPM

## 2023-12-18 DIAGNOSIS — F01.50 MIXED ALZHEIMER'S AND VASCULAR DEMENTIA: ICD-10-CM

## 2023-12-18 DIAGNOSIS — I10 HYPERTENSION, UNSPECIFIED TYPE: ICD-10-CM

## 2023-12-18 DIAGNOSIS — R41.3 OTHER AMNESIA: ICD-10-CM

## 2023-12-18 DIAGNOSIS — E78.5 DYSLIPIDEMIA: ICD-10-CM

## 2023-12-18 DIAGNOSIS — I69.30 LATE EFFECT OF CEREBROVASCULAR ACCIDENT (CVA): ICD-10-CM

## 2023-12-18 DIAGNOSIS — F01.50 MIXED ALZHEIMER'S AND VASCULAR DEMENTIA: Primary | ICD-10-CM

## 2023-12-18 DIAGNOSIS — F02.80 MIXED ALZHEIMER'S AND VASCULAR DEMENTIA: ICD-10-CM

## 2023-12-18 DIAGNOSIS — F02.80 MIXED ALZHEIMER'S AND VASCULAR DEMENTIA: Primary | ICD-10-CM

## 2023-12-18 DIAGNOSIS — G30.9 MIXED ALZHEIMER'S AND VASCULAR DEMENTIA: Primary | ICD-10-CM

## 2023-12-18 DIAGNOSIS — G30.9 MIXED ALZHEIMER'S AND VASCULAR DEMENTIA: ICD-10-CM

## 2023-12-18 LAB
TSH SERPL DL<=0.005 MIU/L-ACNC: 3.52 UIU/ML (ref 0.4–4)
VIT B12 SERPL-MCNC: 225 PG/ML (ref 210–950)

## 2023-12-18 PROCEDURE — 1126F PR PAIN SEVERITY QUANTIFIED, NO PAIN PRESENT: ICD-10-PCS | Mod: CPTII,S$GLB,, | Performed by: PSYCHIATRY & NEUROLOGY

## 2023-12-18 PROCEDURE — 84443 ASSAY THYROID STIM HORMONE: CPT | Performed by: PSYCHIATRY & NEUROLOGY

## 2023-12-18 PROCEDURE — 82607 VITAMIN B-12: CPT | Performed by: PSYCHIATRY & NEUROLOGY

## 2023-12-18 PROCEDURE — 99204 PR OFFICE/OUTPT VISIT, NEW, LEVL IV, 45-59 MIN: ICD-10-PCS | Mod: S$GLB,,, | Performed by: PSYCHIATRY & NEUROLOGY

## 2023-12-18 PROCEDURE — 36415 COLL VENOUS BLD VENIPUNCTURE: CPT | Performed by: PSYCHIATRY & NEUROLOGY

## 2023-12-18 PROCEDURE — 99204 OFFICE O/P NEW MOD 45 MIN: CPT | Mod: S$GLB,,, | Performed by: PSYCHIATRY & NEUROLOGY

## 2023-12-18 PROCEDURE — 1159F MED LIST DOCD IN RCRD: CPT | Mod: CPTII,S$GLB,, | Performed by: PSYCHIATRY & NEUROLOGY

## 2023-12-18 PROCEDURE — 1160F RVW MEDS BY RX/DR IN RCRD: CPT | Mod: CPTII,S$GLB,, | Performed by: PSYCHIATRY & NEUROLOGY

## 2023-12-18 PROCEDURE — 1160F PR REVIEW ALL MEDS BY PRESCRIBER/CLIN PHARMACIST DOCUMENTED: ICD-10-PCS | Mod: CPTII,S$GLB,, | Performed by: PSYCHIATRY & NEUROLOGY

## 2023-12-18 PROCEDURE — 1126F AMNT PAIN NOTED NONE PRSNT: CPT | Mod: CPTII,S$GLB,, | Performed by: PSYCHIATRY & NEUROLOGY

## 2023-12-18 PROCEDURE — 99999 PR PBB SHADOW E&M-EST. PATIENT-LVL IV: CPT | Mod: PBBFAC,,, | Performed by: PSYCHIATRY & NEUROLOGY

## 2023-12-18 PROCEDURE — 1159F PR MEDICATION LIST DOCUMENTED IN MEDICAL RECORD: ICD-10-PCS | Mod: CPTII,S$GLB,, | Performed by: PSYCHIATRY & NEUROLOGY

## 2023-12-18 PROCEDURE — 99999 PR PBB SHADOW E&M-EST. PATIENT-LVL IV: ICD-10-PCS | Mod: PBBFAC,,, | Performed by: PSYCHIATRY & NEUROLOGY

## 2023-12-18 RX ORDER — FUROSEMIDE 20 MG/1
20 TABLET ORAL DAILY
COMMUNITY
Start: 2023-12-06

## 2023-12-18 RX ORDER — METOPROLOL SUCCINATE 25 MG/1
25 TABLET, EXTENDED RELEASE ORAL 2 TIMES DAILY
COMMUNITY
Start: 2023-10-25

## 2023-12-18 RX ORDER — DONEPEZIL HYDROCHLORIDE 5 MG/1
5 TABLET, FILM COATED ORAL NIGHTLY
COMMUNITY
Start: 2023-11-28

## 2023-12-18 RX ORDER — MEMANTINE HYDROCHLORIDE 5 MG/1
TABLET ORAL
Qty: 60 TABLET | Refills: 11 | Status: SHIPPED | OUTPATIENT
Start: 2023-12-18

## 2023-12-18 RX ORDER — SUCRALFATE 1 G/1
1 TABLET ORAL 2 TIMES DAILY
COMMUNITY
Start: 2023-09-12

## 2023-12-18 RX ORDER — HYDROCHLOROTHIAZIDE 12.5 MG/1
12.5 TABLET ORAL DAILY
COMMUNITY
Start: 2023-09-05

## 2023-12-18 RX ORDER — ARIPIPRAZOLE 2 MG/1
2 TABLET ORAL NIGHTLY
COMMUNITY
Start: 2023-11-21

## 2023-12-18 NOTE — PROGRESS NOTES
"Consult from Dr Hernandez    HPI: Lexii Dey is a 89 y.o. female with a history of memory problems first noted by patient her children . The symptoms also include not know time relationships  Examples of memory problems noted include? May forget a recent conversation. Forgets appointment, getting more accusatory against her father which has improved on Abilify per PCP  Gets easily annoyed with     On aricept per PCP    Has had some UTIs which makes symptoms worse    Denies tremor and walks with a walker (started walker after some falls at home)      Level of education completed is  9th grade education    Can dress self, do her hygiene.    Does get out socially and family wishes she would do this more      Lives in assisted living: [facility] which she has lived for nearly 3 years. Lives there with her  of over 70 years  Here with  her daughter and Son in law today    Has one more living child with Down Syndrome. One other child  with Down Syndrome in infancy.     P is 84      Review of Systems   Unable to perform ROS: Dementia         I have reviewed all of this patient's past medical and surgical histories as well as family and social histories and active allergies and medications as documented in the electronic medical record.        Exam:  Gen Appearance, well developed/nourished in no apparent distress  CV: 2+ distal pulses with no edema or swelling  Neuro:  MS: Awake, alert, oriented to place, person, but not fully to time, situation. Sustains attention. Recent recall was 1/3 at PCP's office and easily forgotten details here/remote memory intact, Language is full to spontaneous speech/repetition/naming/comprehension. Fund of Knowledge is full  Not able to name current president. Names "[place]."   + apraxia  Poor insight into challenges  CN: Optic discs are flat with normal vasculature, PERRL, Extraoccular movements and visual fields are full. Normal facial sensation and strength, " Hearing symmetric, Tongue and Palate are midline and strong. Shoulder Shrug symmetric and strong.  Motor: Normal bulk, tone, no abnormal movements. 5/5 strength bilateral upper/lower extremities with 2+ reflexes and no clonus  Sensory: symmetric to temp, and vibration. 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   Labs: 2022 Labs; Chronic anemia by CBC. Normal Cr and LFTs by CMP    Assessment/Plan: Lexii Dey is a 89 y.o. female with memory loss noted this year. Symptoms rise to the level of dementia  I recommend:   Note: CT head 2/2022:  Old subcentimeter lacunar infarct right caudate head and left external capsule.   -She has a known history of stroke (left arm weakness in 2020) and per PCP's note Carotid US no blockage in 2022 and echo EF 60%. No afib history known/ likely has had cardiac monitor per family. Otherwise, this could be from her HTN, DLD (lacunar)    2. Update CT head  -she is on an ASA for stroke prevention. Consider updating cardiac monitoring if another stroke is noted.     3. TSH, B12 needed per orders    4. 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   -PCP has her on Aricept/ continue  -Add Namenda unless side effects  -Note her use of Abilify per PCP for mood and agitation/ delusions and this helps    RTC 12 weeks  CC:  Dr. Hernandez

## 2023-12-26 ENCOUNTER — HOSPITAL ENCOUNTER (OUTPATIENT)
Dept: RADIOLOGY | Facility: HOSPITAL | Age: 88
Discharge: HOME | End: 2023-12-26
Attending: PSYCHIATRY & NEUROLOGY
Payer: MEDICARE

## 2023-12-26 DIAGNOSIS — R41.3 OTHER AMNESIA: ICD-10-CM

## 2023-12-26 PROCEDURE — 70450 CT HEAD/BRAIN W/O DYE: CPT | Mod: 26,,, | Performed by: RADIOLOGY

## 2023-12-26 PROCEDURE — 70450 CT HEAD WITHOUT CONTRAST: ICD-10-PCS | Mod: 26,,, | Performed by: RADIOLOGY

## 2023-12-26 PROCEDURE — 70450 CT HEAD/BRAIN W/O DYE: CPT | Mod: TC

## 2024-03-11 ENCOUNTER — OFFICE VISIT (OUTPATIENT)
Dept: NEUROLOGY | Facility: CLINIC | Age: 89
End: 2024-03-11
Payer: MEDICARE

## 2024-03-11 VITALS
HEART RATE: 72 BPM | BODY MASS INDEX: 26.05 KG/M2 | DIASTOLIC BLOOD PRESSURE: 66 MMHG | WEIGHT: 132.69 LBS | SYSTOLIC BLOOD PRESSURE: 128 MMHG | RESPIRATION RATE: 14 BRPM | HEIGHT: 60 IN

## 2024-03-11 DIAGNOSIS — G30.9 MIXED ALZHEIMER'S AND VASCULAR DEMENTIA: Primary | ICD-10-CM

## 2024-03-11 DIAGNOSIS — F01.50 MIXED ALZHEIMER'S AND VASCULAR DEMENTIA: Primary | ICD-10-CM

## 2024-03-11 DIAGNOSIS — I10 HYPERTENSION, UNSPECIFIED TYPE: ICD-10-CM

## 2024-03-11 DIAGNOSIS — I69.30 LATE EFFECT OF CEREBROVASCULAR ACCIDENT (CVA): ICD-10-CM

## 2024-03-11 DIAGNOSIS — E78.5 DYSLIPIDEMIA: ICD-10-CM

## 2024-03-11 DIAGNOSIS — F02.80 MIXED ALZHEIMER'S AND VASCULAR DEMENTIA: Primary | ICD-10-CM

## 2024-03-11 PROCEDURE — 1159F MED LIST DOCD IN RCRD: CPT | Mod: CPTII,S$GLB,, | Performed by: PSYCHIATRY & NEUROLOGY

## 2024-03-11 PROCEDURE — 99214 OFFICE O/P EST MOD 30 MIN: CPT | Mod: S$GLB,,, | Performed by: PSYCHIATRY & NEUROLOGY

## 2024-03-11 PROCEDURE — 1126F AMNT PAIN NOTED NONE PRSNT: CPT | Mod: CPTII,S$GLB,, | Performed by: PSYCHIATRY & NEUROLOGY

## 2024-03-11 PROCEDURE — 1160F RVW MEDS BY RX/DR IN RCRD: CPT | Mod: CPTII,S$GLB,, | Performed by: PSYCHIATRY & NEUROLOGY

## 2024-03-11 PROCEDURE — 99999 PR PBB SHADOW E&M-EST. PATIENT-LVL III: CPT | Mod: PBBFAC,,, | Performed by: PSYCHIATRY & NEUROLOGY

## 2024-03-11 NOTE — PROGRESS NOTES
HPI: Lexii Dey is a 89 y.o. female with a history of memory problems    B12 is used daily     Namenda is being given 1 BID but daughter will take over med as  sees she has extra with him administering.     Memory is about the same    On aricept per PCP and consistency of use is not certain    Mood is better    Delulsions don't seem active    No hallucinations      Daughter wishes she would get out more in the Assisted living. She does go to [place] daily and family encourages her to do this.  She does get out of the facility once a month    Lives in assisted living: [facility]       Review of Systems   Unable to perform ROS: Dementia         I have reviewed all of this patient's past medical and surgical histories as well as family and social histories and active allergies and medications as documented in the electronic medical record.        Exam:  Gen Appearance, well developed/nourished in no apparent distress  CV: 2+ distal pulses with no edema or swelling  Neuro:  MS: Awake, alert, oriented to place, person, but to time, situation. Sustains attention. Recent recall  is forgetful/remote memory intact, Language is full to spontaneous speech/repetition/naming/comprehension. Fund of Knowledge is full  + apraxia  Poor insight into challenges  CN: Optic discs are flat with normal vasculature, PERRL, Extraoccular movements and visual fields are full. Normal facial sensation and strength, Hearing symmetric, Tongue and Palate are midline and strong. Shoulder Shrug symmetric and strong.  Motor: Normal bulk, tone, no abnormal movements. 5/5 strength bilateral upper/lower extremities with 2+ reflexes and no clonus  Sensory: symmetric to temp, and vibration. Romberg negative  Cerebellar: Finger-nose,Heal-shin, Rapid alternating movements intact  Gait: Normal stance, mild  ataxia and shorter steps normal. Uses walker well    Imaging: CT head 2/2022:  Old subcentimeter lacunar infarct right caudate head and  left external capsule.     12/2023 CT head: No acute abnormality.     Labs: 2022 Labs; Chronic anemia by CBC. Normal Cr and LFTs by CMP    2023 B12 200s  Tsh Normal    Assessment/Plan: Lexii Dey is a 89 y.o. female with memory loss noted this year. Symptoms rise to the level of dementia  I recommend:     Note: CT head 2/2022:  Old subcentimeter lacunar infarct right caudate head and left external capsule.   -She has a known history of stroke (left arm weakness in 2020) and per PCP's note Carotid US no blockage in 2022 and echo EF 60%. No afib history known/ likely has had cardiac monitor per family. Otherwise, this could be from her HTN, DLD (lacunar)    2. Updated CT head 12/2023: no changes  -she is on an ASA for stroke prevention.     3. 2023 B12 level lower normal  -Continue po B12  -Follow up B12 level after the next visit    4. 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   -PCP has her on Aricept/ continue  -Added Namenda. Not clear if she is fully compliant. Daughter will take over med management  -Note her use of Abilify per PCP for mood and agitation/ delusions and this helps    RTC  6 months

## 2024-07-10 DIAGNOSIS — G30.9 MIXED ALZHEIMER'S AND VASCULAR DEMENTIA: ICD-10-CM

## 2024-07-10 DIAGNOSIS — R41.3 OTHER AMNESIA: ICD-10-CM

## 2024-07-10 DIAGNOSIS — F01.50 MIXED ALZHEIMER'S AND VASCULAR DEMENTIA: ICD-10-CM

## 2024-07-10 DIAGNOSIS — F02.80 MIXED ALZHEIMER'S AND VASCULAR DEMENTIA: ICD-10-CM

## 2024-07-10 RX ORDER — MEMANTINE HYDROCHLORIDE 5 MG/1
5 TABLET ORAL 2 TIMES DAILY
Qty: 60 TABLET | Refills: 11 | Status: SHIPPED | OUTPATIENT
Start: 2024-07-10

## 2024-09-30 ENCOUNTER — LAB VISIT (OUTPATIENT)
Dept: LAB | Facility: HOSPITAL | Age: 89
End: 2024-09-30
Attending: PSYCHIATRY & NEUROLOGY
Payer: MEDICARE

## 2024-09-30 ENCOUNTER — OFFICE VISIT (OUTPATIENT)
Dept: NEUROLOGY | Facility: CLINIC | Age: 89
End: 2024-09-30
Payer: MEDICARE

## 2024-09-30 VITALS
RESPIRATION RATE: 14 BRPM | HEIGHT: 58 IN | WEIGHT: 145.75 LBS | SYSTOLIC BLOOD PRESSURE: 106 MMHG | HEART RATE: 98 BPM | BODY MASS INDEX: 30.59 KG/M2 | DIASTOLIC BLOOD PRESSURE: 76 MMHG

## 2024-09-30 DIAGNOSIS — F01.50 MIXED ALZHEIMER'S AND VASCULAR DEMENTIA: Primary | ICD-10-CM

## 2024-09-30 DIAGNOSIS — G30.9 MIXED ALZHEIMER'S AND VASCULAR DEMENTIA: Primary | ICD-10-CM

## 2024-09-30 DIAGNOSIS — E78.5 DYSLIPIDEMIA: ICD-10-CM

## 2024-09-30 DIAGNOSIS — I10 HYPERTENSION, UNSPECIFIED TYPE: ICD-10-CM

## 2024-09-30 DIAGNOSIS — I69.30 LATE EFFECT OF CEREBROVASCULAR ACCIDENT (CVA): ICD-10-CM

## 2024-09-30 DIAGNOSIS — E53.8 B12 DEFICIENCY: ICD-10-CM

## 2024-09-30 DIAGNOSIS — F02.80 MIXED ALZHEIMER'S AND VASCULAR DEMENTIA: Primary | ICD-10-CM

## 2024-09-30 LAB — VIT B12 SERPL-MCNC: >2000 PG/ML (ref 210–950)

## 2024-09-30 PROCEDURE — 99214 OFFICE O/P EST MOD 30 MIN: CPT | Mod: S$GLB,,, | Performed by: PSYCHIATRY & NEUROLOGY

## 2024-09-30 PROCEDURE — 82607 VITAMIN B-12: CPT | Performed by: PSYCHIATRY & NEUROLOGY

## 2024-09-30 PROCEDURE — 36415 COLL VENOUS BLD VENIPUNCTURE: CPT | Performed by: PSYCHIATRY & NEUROLOGY

## 2024-09-30 PROCEDURE — G2211 COMPLEX E/M VISIT ADD ON: HCPCS | Mod: S$GLB,,, | Performed by: PSYCHIATRY & NEUROLOGY

## 2024-09-30 PROCEDURE — 99999 PR PBB SHADOW E&M-EST. PATIENT-LVL III: CPT | Mod: PBBFAC,,, | Performed by: PSYCHIATRY & NEUROLOGY

## 2024-09-30 PROCEDURE — 1160F RVW MEDS BY RX/DR IN RCRD: CPT | Mod: CPTII,S$GLB,, | Performed by: PSYCHIATRY & NEUROLOGY

## 2024-09-30 PROCEDURE — 1159F MED LIST DOCD IN RCRD: CPT | Mod: CPTII,S$GLB,, | Performed by: PSYCHIATRY & NEUROLOGY

## 2024-09-30 PROCEDURE — 1126F AMNT PAIN NOTED NONE PRSNT: CPT | Mod: CPTII,S$GLB,, | Performed by: PSYCHIATRY & NEUROLOGY

## 2024-09-30 RX ORDER — FUROSEMIDE 20 MG/1
20 TABLET ORAL DAILY PRN
COMMUNITY

## 2024-09-30 NOTE — PROGRESS NOTES
HPI: Lexii Dey is a 89 y.o. female with a history of memory problems      Here for 6 months follow up      Memory is about same    Namenda use is ongoing    Aricept use is ongoing    Med monitoring is by her daughter and     Med adherence is much better     Abilify per PCP is ongoing.    No hallucinations and no delusions    B12  use is ongoing       Mood is good    Recently had a UTI which presented with some acute confusion which resolved with treatment      Social activities are a little more now but resistant    Lives in assisted living: [facility]     PCP monitors labs and HTN      Review of Systems   Unable to perform ROS: Dementia         I have reviewed all of this patient's past medical and surgical histories as well as family and social histories and active allergies and medications as documented in the electronic medical record.        Exam:  Gen Appearance, well developed/nourished in no apparent distress  CV: 2+ distal pulses with no edema or swelling  Neuro:  MS: Awake, alert,  Sustains attention. Recent recall  is forgetful/remote memory intact, Language is full to spontaneous speech/repetition/naming/comprehension. Fund of Knowledge is full  + apraxia  Poor insight into challenges  CN: Optic discs are flat with normal vasculature, PERRL, Extraoccular movements and visual fields are full. Normal facial sensation and strength, Hearing symmetric, Tongue and Palate are midline and strong. Shoulder Shrug symmetric and strong.  Motor: Normal bulk, tone, no abnormal movements. 5/5 strength bilateral upper/lower extremities with 2+ reflexes and no clonus  Sensory: symmetric to temp, and vibration. Romberg negative  Cerebellar: Finger-nose,Heal-shin, Rapid alternating movements intact  Gait: Normal stance, mild  ataxia and shorter steps normal. 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     12/2023 CT head:  No acute abnormality.     Labs: 2022 Labs; Chronic anemia by CBC. Normal Cr and LFTs by CMP    2023 B12 200s  Tsh Normal    Assessment/Plan: Lexii Dey is a 89 y.o. female with memory los. Symptoms rise to the level of dementia  I recommend:     Note: CT head 2/2022:  Old subcentimeter lacunar infarct right caudate head and left external capsule.   -She has a known history of stroke (left arm weakness in 2020) and per PCP's note: Carotid US no blockage in 2022 and echo EF 60%. No afib history known/ likely has had cardiac monitor per family. Otherwise, this could be from her HTN, DLD (lacunar)    2. Updated CT head 12/2023: no changes  -she is on an ASA for stroke prevention.     3. 2023 B12 level lower normal  -Continue po B12  -Follow up B12 level now    4. 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   -PCP has her on Aricept/ continue  -Added Namenda.Daughter and  are monitoring meds now   -Note her use of Abilify per PCP for mood and agitation/ delusions and this helps    RTC  6 months    Visit today included increased complexity associated with the care of the episodic problem memory loss addressed and managing the longitudinal care of the patient due to the serious and/or complex managed problem(s) Mixed Dementia.

## 2024-10-09 ENCOUNTER — TELEPHONE (OUTPATIENT)
Dept: NEUROLOGY | Facility: CLINIC | Age: 89
End: 2024-10-09
Payer: MEDICARE

## 2024-10-09 NOTE — TELEPHONE ENCOUNTER
----- Message from Med Assistant Varela sent at 10/9/2024 10:42 AM CDT -----  Regarding: BLOOD WORK RESULTS  Patient's daughter Marisa is calling concern about her mothers B12 results.    [phone]

## 2024-10-09 NOTE — TELEPHONE ENCOUNTER
Patients daughter Marisa calling for B12 level results. Patient is currently taking 1000 mcg daily. Please advise.

## 2025-03-31 ENCOUNTER — OFFICE VISIT (OUTPATIENT)
Dept: NEUROLOGY | Facility: CLINIC | Age: OVER 89
End: 2025-03-31
Payer: MEDICARE

## 2025-03-31 VITALS
WEIGHT: 138.69 LBS | DIASTOLIC BLOOD PRESSURE: 74 MMHG | SYSTOLIC BLOOD PRESSURE: 100 MMHG | RESPIRATION RATE: 16 BRPM | HEART RATE: 68 BPM | HEIGHT: 58 IN | BODY MASS INDEX: 29.11 KG/M2

## 2025-03-31 DIAGNOSIS — E78.5 DYSLIPIDEMIA: ICD-10-CM

## 2025-03-31 DIAGNOSIS — I69.30 LATE EFFECT OF CEREBROVASCULAR ACCIDENT (CVA): ICD-10-CM

## 2025-03-31 DIAGNOSIS — E53.8 B12 DEFICIENCY: ICD-10-CM

## 2025-03-31 DIAGNOSIS — G30.9 MIXED ALZHEIMER'S AND VASCULAR DEMENTIA: Primary | ICD-10-CM

## 2025-03-31 DIAGNOSIS — F01.50 MIXED ALZHEIMER'S AND VASCULAR DEMENTIA: Primary | ICD-10-CM

## 2025-03-31 DIAGNOSIS — I10 HYPERTENSION, UNSPECIFIED TYPE: ICD-10-CM

## 2025-03-31 DIAGNOSIS — F02.80 MIXED ALZHEIMER'S AND VASCULAR DEMENTIA: Primary | ICD-10-CM

## 2025-03-31 PROCEDURE — 99999 PR PBB SHADOW E&M-EST. PATIENT-LVL III: CPT | Mod: PBBFAC,,, | Performed by: PSYCHIATRY & NEUROLOGY

## 2025-03-31 PROCEDURE — 1159F MED LIST DOCD IN RCRD: CPT | Mod: CPTII,S$GLB,, | Performed by: PSYCHIATRY & NEUROLOGY

## 2025-03-31 PROCEDURE — G2211 COMPLEX E/M VISIT ADD ON: HCPCS | Mod: S$GLB,,, | Performed by: PSYCHIATRY & NEUROLOGY

## 2025-03-31 PROCEDURE — 99214 OFFICE O/P EST MOD 30 MIN: CPT | Mod: S$GLB,,, | Performed by: PSYCHIATRY & NEUROLOGY

## 2025-03-31 PROCEDURE — 1126F AMNT PAIN NOTED NONE PRSNT: CPT | Mod: CPTII,S$GLB,, | Performed by: PSYCHIATRY & NEUROLOGY

## 2025-03-31 PROCEDURE — 1160F RVW MEDS BY RX/DR IN RCRD: CPT | Mod: CPTII,S$GLB,, | Performed by: PSYCHIATRY & NEUROLOGY

## 2025-03-31 RX ORDER — ASPIRIN 81 MG
100 TABLET, DELAYED RELEASE (ENTERIC COATED) ORAL 2 TIMES DAILY PRN
COMMUNITY

## 2025-03-31 RX ORDER — LOPERAMIDE HCL 2 MG
2 TABLET ORAL DAILY PRN
COMMUNITY

## 2025-03-31 RX ORDER — LANOLIN ALCOHOL/MO/W.PET/CERES
1000 CREAM (GRAM) TOPICAL DAILY
COMMUNITY

## 2025-03-31 RX ORDER — PERPHENAZINE 2 MG
TABLET ORAL
COMMUNITY

## 2025-03-31 RX ORDER — CARBOXYMETHYLCELLULOSE SODIUM 5 MG/ML
1 SOLUTION/ DROPS OPHTHALMIC 3 TIMES DAILY PRN
COMMUNITY

## 2025-03-31 RX ORDER — VENLAFAXINE HYDROCHLORIDE 75 MG/1
75 CAPSULE, EXTENDED RELEASE ORAL
COMMUNITY
Start: 2025-03-10

## 2025-03-31 RX ORDER — LORATADINE 10 MG/1
10 TABLET ORAL DAILY
COMMUNITY

## 2025-03-31 RX ORDER — NITROFURANTOIN 25; 75 MG/1; MG/1
100 CAPSULE ORAL 2 TIMES DAILY
COMMUNITY
Start: 2025-02-25

## 2025-03-31 NOTE — PROGRESS NOTES
HPI: Lexii Dey is a 89 y.o. female with a history of memory problems      This is her 6 months follow up      Memory is slowly worsening over time    Namenda, Aricept and B12 use is ongoing we think/ daughter does not have med.       Med adherence is via medical management at Assisted living    Abilify per PCP is ongoing reportedly    No hallucinations and no delusions    Mood is ok      in 2024 and she forgets he     Pulse is normal    Social activities are less so and she enjoys where she lives    Lives in assisted living: [facility]     PCP monitors labs and HTN      Review of Systems   Unable to perform ROS: Dementia         I have reviewed all of this patient's past medical and surgical histories as well as family and social histories and active allergies and medications as documented in the electronic medical record.        Exam:  Gen Appearance, well developed/nourished in no apparent distress  CV: 2+ distal pulses with no edema or swelling  Neuro:  MS: Awake, alert,  Sustains attention. Recent recall  is forgetful/remote memory intact, Language is full to spontaneous speech//comprehension. Fund of Knowledge is full  + apraxia  Poor insight into challenges  CN: Optic discs are flat with normal vasculature, PERRL, Extraoccular movements and visual fields are full. Normal facial sensation and strength, Hearing symmetric, Tongue and Palate are midline and strong. Shoulder Shrug symmetric and strong.  Motor: Normal bulk, tone, no abnormal movements. 5/5 strength bilateral upper/lower extremities with 2+ reflexes and no clonus  Sensory: symmetric to temp, and vibration. Romberg negative  Cerebellar: Finger-nose,Heal-shin, Rapid alternating movements intact  Gait: Normal stance, mild  ataxia and shorter steps chroncially,  Uses walker well    Imaging: CT head 2022:  Old subcentimeter lacunar infarct right caudate head and left external capsule.     2023 CT head: No acute  abnormality.     Labs: 2022 Labs; Chronic anemia by CBC. Normal Cr and LFTs by CMP    2023 B12 200s  Tsh Normal    9/2024 B12 level well corrected    Assessment/Plan: Lexii Dey is a 89 y.o. female with memory los. Symptoms rise to the level of dementia  I recommend:     Note: CT head 2/2022:  Old subcentimeter lacunar infarct right caudate head and left external capsule.   -She has a known history of stroke (left arm weakness in 2020) and per PCP's note: Carotid US no blockage in 2022 and echo EF 60%. No afib history known/ likely has had cardiac monitor per family. Otherwise, this could be from her HTN, DLD (lacunar)    2. Updated CT head 12/2023: no changes  -she is on an ASA for stroke prevention.     3. 2023 B12 level lower normal  -Continue po B12/ levels well corrected in 2024    4. 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   -PCP has her on Aricept/ continue  -Added Namenda.Assisted living is monitoring her meds  -Note her use of Abilify per PCP for mood and agitation/ delusions and this helps    RTC  6 months (ok for virtual visit)    Visit today included increased complexity associated with the care of the episodic problem memory loss addressed and managing the longitudinal care of the patient due to the serious and/or complex managed problem(s) Mixed Dementia.